# Patient Record
Sex: MALE | Race: WHITE | Employment: FULL TIME | URBAN - METROPOLITAN AREA
[De-identification: names, ages, dates, MRNs, and addresses within clinical notes are randomized per-mention and may not be internally consistent; named-entity substitution may affect disease eponyms.]

---

## 2022-03-22 ENCOUNTER — OFFICE VISIT (OUTPATIENT)
Dept: ORTHOPEDIC SURGERY | Age: 42
End: 2022-03-22
Payer: COMMERCIAL

## 2022-03-22 VITALS — HEIGHT: 72 IN | WEIGHT: 180 LBS | BODY MASS INDEX: 24.38 KG/M2

## 2022-03-22 DIAGNOSIS — S82.62XA CLOSED DISPLACED FRACTURE OF LATERAL MALLEOLUS OF LEFT FIBULA, INITIAL ENCOUNTER: Primary | ICD-10-CM

## 2022-03-22 DIAGNOSIS — M25.572 ACUTE LEFT ANKLE PAIN: ICD-10-CM

## 2022-03-22 PROCEDURE — 99203 OFFICE O/P NEW LOW 30 MIN: CPT | Performed by: ORTHOPAEDIC SURGERY

## 2022-03-22 NOTE — LETTER
3/23/2022    Patient: Sachin Daley   YOB: 1980   Date of Visit: 3/22/2022     Ze Chen MD  Via In Basket    Dear Ze Chen MD,      Thank you for referring Mr. Sachin Daley to [facility] for evaluation. My notes for this consultation are attached. If you have questions, please do not hesitate to call me. I look forward to following your patient along with you.       Sincerely,    Norm Umaña MD
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
## 2022-03-23 DIAGNOSIS — M25.572 ACUTE LEFT ANKLE PAIN: ICD-10-CM

## 2022-03-23 DIAGNOSIS — S82.62XA CLOSED DISPLACED FRACTURE OF LATERAL MALLEOLUS OF LEFT FIBULA, INITIAL ENCOUNTER: Primary | ICD-10-CM

## 2022-03-24 ENCOUNTER — TELEPHONE (OUTPATIENT)
Dept: ORTHOPEDIC SURGERY | Age: 42
End: 2022-03-24

## 2022-03-28 DIAGNOSIS — G89.18 ACUTE POST-OPERATIVE PAIN: ICD-10-CM

## 2022-03-28 DIAGNOSIS — S82.62XA CLOSED DISPLACED FRACTURE OF LATERAL MALLEOLUS OF LEFT FIBULA, INITIAL ENCOUNTER: Primary | ICD-10-CM

## 2022-03-28 RX ORDER — OXYCODONE HYDROCHLORIDE 5 MG/1
5 TABLET ORAL
Qty: 30 TABLET | Refills: 0 | Status: SHIPPED | OUTPATIENT
Start: 2022-03-28 | End: 2022-03-31

## 2022-03-28 NOTE — PROGRESS NOTES
Post op medications prescribed. Oxycodone 5 mg 1 tab po q4-6 prn pain, #20 no refills.   [facility] sent to [facility] in [place], [address]

## 2022-04-12 ENCOUNTER — OFFICE VISIT (OUTPATIENT)
Dept: ORTHOPEDIC SURGERY | Age: 42
End: 2022-04-12
Payer: COMMERCIAL

## 2022-04-12 DIAGNOSIS — Z09 SURGICAL FOLLOW-UP CARE: Primary | ICD-10-CM

## 2022-04-12 DIAGNOSIS — S82.62XD CLOSED DISPLACED FRACTURE OF LATERAL MALLEOLUS OF LEFT FIBULA WITH ROUTINE HEALING, SUBSEQUENT ENCOUNTER: ICD-10-CM

## 2022-04-12 PROCEDURE — L4387 NON-PNEUM WALK BOOT PRE OTS: HCPCS | Performed by: ORTHOPAEDIC SURGERY

## 2022-04-12 PROCEDURE — 99024 POSTOP FOLLOW-UP VISIT: CPT | Performed by: ORTHOPAEDIC SURGERY

## 2022-04-12 NOTE — LETTER
4/18/2022    Patient: London Mcclain   YOB: 1980   Date of Visit: 4/12/2022     Carin Apple [place] [id number]  Via Fax: [phone]    Dear Carin Apple MD,      Thank you for referring Mr. London Mcclain to [facility] for evaluation. My notes for this consultation are attached. If you have questions, please do not hesitate to call me. I look forward to following your patient along with you.       Sincerely,    Gloria Henry MD

## 2022-04-12 NOTE — PROGRESS NOTES
Khari Moncada (: 1980) is a 39 y.o. male, patient,here for evaluation of the following   Chief Complaint   Patient presents with    Surgical Follow-up     Closed displaced fracture of lateral malleolus of left fibula ORIF on 22        ASSESSMENT/PLAN:  Below is the assessment and plan developed based on review of pertinent history, physical exam, labs, studies, and medications. 1. Surgical follow-up care  -     REFERRAL TO DME  -     SC NON-PNEUM WALK BOOT PRE OTS  -     REFERRAL TO PHYSICAL THERAPY  2. Closed displaced fracture of lateral malleolus of left fibula with routine healing, subsequent encounter  -     XR ANKLE LT MIN 3 V; Future  -     REFERRAL TO DME  -     SC NON-PNEUM WALK BOOT PRE OTS  -     REFERRAL TO PHYSICAL THERAPY    Patient is doing very well status post lateral malleolus fracture open reduction internal fixation with syndesmosis fixation. He has no other complaints today. He is provided a tall boot and referred to physical therapy to start range of motion exercises. He is not ready to start weightbearing yet. However we did fit him with a tall boot today. Next time he returns we will get new x-rays of the left ankle 3 views nonweightbearing. Return in about 4 weeks (around 5/10/2022) for repeat xrays. No Known Allergies    Current Outpatient Medications   Medication Sig    EPINEPHrine (EPIPEN) 0.3 mg/0.3 mL injection 0.3 mL by IntraMUSCular route once as needed for 1 dose. No current facility-administered medications for this visit. No past medical history on file. Past Surgical History:   Procedure Laterality Date    HX ORTHOPAEDIC      plates on radius and ulna in left arm. No family history on file.     Social History     Socioeconomic History    Marital status:      Spouse name: Not on file    Number of children: Not on file    Years of education: Not on file    Highest education level: Not on file   Occupational History    Not on file   Tobacco Use    Smoking status: Never Smoker    Smokeless tobacco: Never Used   Substance and Sexual Activity    Alcohol use: Not Currently    Drug use: Not Currently    Sexual activity: Not on file   Other Topics Concern    Not on file   Social History Narrative    Not on file     Social Determinants of Health     Financial Resource Strain:     Difficulty of Paying Living Expenses: Not on file   Food Insecurity:     Worried About Running Out of Food in the Last Year: Not on file    Sofya of Food in the Last Year: Not on file   Transportation Needs:     Lack of Transportation (Medical): Not on file    Lack of Transportation (Non-Medical): Not on file   Physical Activity:     Days of Exercise per Week: Not on file    Minutes of Exercise per Session: Not on file   Stress:     Feeling of Stress : Not on file   Social Connections:     Frequency of Communication with Friends and Family: Not on file    Frequency of Social Gatherings with Friends and Family: Not on file    Attends Mandaeism Services: Not on file    Active Member of [address] [place] [address] [employer] or Organizations: Not on file    Attends Club or Organization Meetings: Not on file    Marital Status: Not on file   Intimate Partner Violence:     Fear of Current or Ex-Partner: Not on file    Emotionally Abused: Not on file    Physically Abused: Not on file    Sexually Abused: Not on file   Housing Stability:     Unable to Pay for Housing in the Last Year: Not on file    Number of [place] in the Last Year: Not on file    Unstable Housing in the Last Year: Not on file           Vitals: There were no vitals taken for this visit. There is no height or weight on file to calculate BMI. SUBJECTIVE/OBJECTIVE:  Nima Hodge (: 1980)   Patient back for follow-up regarding the left ankle displaced lateral malleolus fracture, date of surgery 2022.   Patient is 15 days postop, he has no complaints, doing very well.      Physical Exam  Pleasant well-nourished male , alert and oriented to person, time and place, no acute distress. Nonweightbearing gait, limited weightbearing stance. Left lower extremity/ankle: Calf soft nontender, incision site looks excellent, healing very well. Ligaments grossly stable. Left foot: Nontender, no swelling, ligament stable. Contralateral foot and ankle exam, nontender, no swelling ligaments grossly stable. Normal weightbearing stance. Neurovascular exam intact for light touch sensation, cap refill, dorsalis pedis pulse palpable, flexion/extension strength 5/5. Skin intact without open wounds, lesions or ulcers, no skin discolorations, normal warmth to skin. Imaging:    XR Results (most recent):  Results from Appointment encounter on 04/12/22    XR ANKLE LT MIN 3 V    Narrative  Left Ankle AP, lateral and oblique nonweightbearing x-rays show the well aligned ankle status post open reduction internal fixation and implants are intact, there is normal ankle mortise. Previous fracture not visible. An electronic signature was used to authenticate this note.   -- Oc Pollock MD

## 2022-04-29 ENCOUNTER — OFFICE VISIT (OUTPATIENT)
Dept: ORTHOPEDIC SURGERY | Age: 42
End: 2022-04-29
Payer: COMMERCIAL

## 2022-04-29 VITALS — WEIGHT: 180 LBS | BODY MASS INDEX: 24.38 KG/M2 | HEIGHT: 72 IN

## 2022-04-29 DIAGNOSIS — L08.9 SUPERFICIAL SKIN INFECTION: ICD-10-CM

## 2022-04-29 DIAGNOSIS — S82.62XD CLOSED DISPLACED FRACTURE OF LATERAL MALLEOLUS OF LEFT FIBULA WITH ROUTINE HEALING, SUBSEQUENT ENCOUNTER: ICD-10-CM

## 2022-04-29 DIAGNOSIS — Z09 SURGICAL FOLLOW-UP CARE: Primary | ICD-10-CM

## 2022-04-29 PROCEDURE — 99024 POSTOP FOLLOW-UP VISIT: CPT | Performed by: ORTHOPAEDIC SURGERY

## 2022-04-29 RX ORDER — CEPHALEXIN 500 MG/1
500 CAPSULE ORAL 4 TIMES DAILY
Qty: 12 CAPSULE | Refills: 0 | Status: SHIPPED | OUTPATIENT
Start: 2022-04-29 | End: 2022-06-14 | Stop reason: ALTCHOICE

## 2022-04-29 NOTE — LETTER
5/2/2022    Patient: Grupo Reza   YOB: 1980   Date of Visit: 4/29/2022     [place] [id number]  Via Fax: [phone]    Dear Kemal Chaney MD,      Thank you for referring Mr. Grupo Reza to [facility] for evaluation. My notes for this consultation are attached. If you have questions, please do not hesitate to call me. I look forward to following your patient along with you.       Sincerely,    Madeleine Quinonez MD

## 2022-04-29 NOTE — PROGRESS NOTES
Jeanne Payton (: 1980) is a 39 y.o. male, patient,here for evaluation of the following   Chief Complaint   Patient presents with    Surgical Follow-up     Closed displaced fracture of lateral malleolus of left fibula ORIF on 22        ASSESSMENT/PLAN:  Below is the assessment and plan developed based on review of pertinent history, physical exam, labs, studies, and medications. 1. Surgical follow-up care  2. Closed displaced fracture of lateral malleolus of left fibula with routine healing, subsequent encounter  -     XR ANKLE LT MIN 3 V; Future  -     REFERRAL TO PHYSICAL THERAPY  3. Superficial skin infection  -     cephALEXin (KEFLEX) 500 mg capsule; Take 1 Capsule by mouth four (4) times daily. Indications: an infection of the skin and the tissue below the skin, Normal, Disp-12 Capsule, R-0    Patient doing well with the exception of the cellulitis at the proximal part of the incision and I placed him on Keflex for few days. We did check the wound to make sure there was no sutures there and this wound is not deep. He will return sooner if any problems arise otherwise in 4 weeks. He is doing okay, going through physical therapy program which will continue and and a new referral is provided today. He is in a boot we will progress to weightbearing as tolerated in boot. Next time he returns we will get new x-rays of left ankle 3 views weightbearing if able to weight-bear. Return in about 6 weeks (around 6/10/2022) for repeat xrays. No Known Allergies    Current Outpatient Medications   Medication Sig    cephALEXin (KEFLEX) 500 mg capsule Take 1 Capsule by mouth four (4) times daily. Indications: an infection of the skin and the tissue below the skin     No current facility-administered medications for this visit. History reviewed. No pertinent past medical history.     Past Surgical History:   Procedure Laterality Date    HX ORTHOPAEDIC      plates on radius and ulna in left arm.       History reviewed. No pertinent family history. Social History     Socioeconomic History    Marital status:      Spouse name: Not on file    Number of children: Not on file    Years of education: Not on file    Highest education level: Not on file   Occupational History    Not on file   Tobacco Use    Smoking status: Never Smoker    Smokeless tobacco: Never Used   Substance and Sexual Activity    Alcohol use: Not Currently    Drug use: Not Currently    Sexual activity: Not on file   Other Topics Concern    Not on file   Social History Narrative    Not on file     Social Determinants of Health     Financial Resource Strain:     Difficulty of Paying Living Expenses: Not on file   Food Insecurity:     Worried About Running Out of Food in the Last Year: Not on file    Sofya of Food in the Last Year: Not on file   Transportation Needs:     Lack of Transportation (Medical): Not on file    Lack of Transportation (Non-Medical):  Not on file   Physical Activity:     Days of Exercise per Week: Not on file    Minutes of Exercise per Session: Not on file   Stress:     Feeling of Stress : Not on file   Social Connections:     Frequency of Communication with Friends and Family: Not on file    Frequency of Social Gatherings with Friends and Family: Not on file    Attends Pentecostal Services: Not on file    Active Member of [address] [place] [address] [employer] or Organizations: Not on file    Attends Club or Organization Meetings: Not on file    Marital Status: Not on file   Intimate Partner Violence:     Fear of Current or Ex-Partner: Not on file    Emotionally Abused: Not on file    Physically Abused: Not on file    Sexually Abused: Not on file   Housing Stability:     Unable to Pay for Housing in the Last Year: Not on file    Number of [place] in the Last Year: Not on file    Unstable Housing in the Last Year: Not on file           Vitals:  Ht 6' (1.829 m)   Wt 180 lb (81.6 kg)   BMI 24.41 kg/m² Body mass index is 24.41 kg/m². SUBJECTIVE/OBJECTIVE:  Leia Shoulders (: 1980)   Patient back for reevaluation of left lower extremity status post open reduction internal fixation of a displaced lateral malleolus fracture, date of surgery 2022. He is doing well he has no complaints today. Denies chest pain, shortness of breath, calf pain or swelling. Patient has been going to physical therapy program and was transition to a boot last time seen. He has not started fully weightbearing however. Physical Exam  Pleasant well-nourished male , alert and oriented to person, time and place, no acute distress. Nonweightbearing gait, limited weightbearing stance. Left lower extremity/ankle: Calves are soft nontender, ligaments grossly stable. The incision site is healing properly, there is minimal swelling. There is 1 area of the incision at the very top part of the incision there is little bit of erythema present/cellulitis without significant drainage but is tender on palpation. The fibula is nontender. Rest of ankle is intact and nontender. Left foot: Nontender, no swelling, ligament stable. Able to flex and extend toes satisfaction range of motion and strength. Neurovascular exam intact for light touch sensation, cap refill, dorsalis pedis pulse palpable, flexion/extension strength 5/5. Skin intact without open wounds, lesions or ulcers, no skin discolorations, normal warmth to skin. Imaging:    XR Results (most recent):  Results from Appointment encounter on 22    XR ANKLE LT MIN 3 V    Narrative  Left ankle AP, lateral and oblique nonweightbearing x-rays show implants intact at the lateral malleolus and the tight rope implant also intact. There is mostly normal ankle mortise, fracture is healing but not complete. Satisfactory bone density. Correction to x-rays, the fracture is healing very well and overall alignment looks good.     An electronic signature was used to authenticate this note.   -- Jerrod Lockwood MD

## 2022-06-14 ENCOUNTER — OFFICE VISIT (OUTPATIENT)
Dept: ORTHOPEDIC SURGERY | Age: 42
End: 2022-06-14
Payer: COMMERCIAL

## 2022-06-14 VITALS — WEIGHT: 180 LBS | BODY MASS INDEX: 24.38 KG/M2 | HEIGHT: 72 IN

## 2022-06-14 DIAGNOSIS — S82.62XD CLOSED DISPLACED FRACTURE OF LATERAL MALLEOLUS OF LEFT FIBULA WITH ROUTINE HEALING, SUBSEQUENT ENCOUNTER: ICD-10-CM

## 2022-06-14 DIAGNOSIS — Z09 SURGICAL FOLLOW-UP CARE: Primary | ICD-10-CM

## 2022-06-14 PROCEDURE — 99024 POSTOP FOLLOW-UP VISIT: CPT | Performed by: ORTHOPAEDIC SURGERY

## 2022-06-14 NOTE — LETTER
6/14/2022    Patient: Antolin Liriano   YOB: 1980   Date of Visit: 6/14/2022     [place] [id number]  Via Fax: [phone]    Dear Tiera Wheat MD,      Thank you for referring Mr. Antolin Liriano to [facility] for evaluation. My notes for this consultation are attached. If you have questions, please do not hesitate to call me. I look forward to following your patient along with you.       Sincerely,    Haseeb Roa MD

## 2022-06-14 NOTE — PROGRESS NOTES
Johnice Closs (: 1980) is a 43 y.o. male, patient,here for evaluation of the following   Chief Complaint   Patient presents with    Ankle Pain        ASSESSMENT/PLAN:  Below is the assessment and plan developed based on review of pertinent history, physical exam, labs, studies, and medications. 1. Surgical follow-up care  2. Closed displaced fracture of lateral malleolus of left fibula with routine healing, subsequent encounter  -     XR STANDING ANKLE LT MIN 3 V; Future    She is doing very well, he will progress activities as tolerated and wean out of boot return to full range of motion, strength, balance exercises. If he continues to have symptoms of pain related to implants and that includes the tightness of the ankle which could be related to the tight rope implant but can never guarantee hardware/implant removal will result in full recovery. He understands this and states he will progress with activities as tolerated and if any problems he will return. We briefly discussed hardware removal, I generally do recommend until near a year if needed. Return if symptoms worsen or fail to improve. No Known Allergies    Current Outpatient Medications   Medication Sig    cephALEXin (KEFLEX) 500 mg capsule Take 1 Capsule by mouth four (4) times daily. Indications: an infection of the skin and the tissue below the skin     No current facility-administered medications for this visit. No past medical history on file. Past Surgical History:   Procedure Laterality Date    HX ORTHOPAEDIC      plates on radius and ulna in left arm. No family history on file.     Social History     Socioeconomic History    Marital status:      Spouse name: Not on file    Number of children: Not on file    Years of education: Not on file    Highest education level: Not on file   Occupational History    Not on file   Tobacco Use    Smoking status: Never Smoker    Smokeless tobacco: Never Used Substance and Sexual Activity    Alcohol use: Not Currently    Drug use: Not Currently    Sexual activity: Not on file   Other Topics Concern    Not on file   Social History Narrative    Not on file     Social Determinants of Health     Financial Resource Strain:     Difficulty of Paying Living Expenses: Not on file   Food Insecurity:     Worried About Running Out of Food in the Last Year: Not on file    Sofya of Food in the Last Year: Not on file   Transportation Needs:     Lack of Transportation (Medical): Not on file    Lack of Transportation (Non-Medical): Not on file   Physical Activity:     Days of Exercise per Week: Not on file    Minutes of Exercise per Session: Not on file   Stress:     Feeling of Stress : Not on file   Social Connections:     Frequency of Communication with Friends and Family: Not on file    Frequency of Social Gatherings with Friends and Family: Not on file    Attends Zoroastrianism Services: Not on file    Active Member of [address] [place] [address] or Organizations: Not on file    Attends Club or Organization Meetings: Not on file    Marital Status: Not on file   Intimate Partner Violence:     Fear of Current or Ex-Partner: Not on file    Emotionally Abused: Not on file    Physically Abused: Not on file    Sexually Abused: Not on file   Housing Stability:     Unable to Pay for Housing in the Last Year: Not on file    Number of [place] in the Last Year: Not on file    Unstable Housing in the Last Year: Not on file           Vitals:  Ht 6' (1.829 m)   Wt 180 lb (81.6 kg)   BMI 24.41 kg/m²    Body mass index is 24.41 kg/m². ROS     Positive for: Musculoskeletal    Last edited by Luci Marley on 2022  8:16 AM. (History)              SUBJECTIVE/OBJECTIVE:  Nima Hodge (: 1980)   Patient back for reevaluation of left ankle we will he had a lateral malleolus displaced fracture, he is status post procedure near 3 months now.   He is doing well he has no other complaints except little bit of stiffness still present ankle joint, he also feels some of the screws underneath the skin. Overall his previous skin and superficial infection has fully resolved he has no other problems today. He feels he is on his way to full recovery. Physical Exam  Pleasant well-nourished male , alert and oriented to person, time and place, no acute distress. Minimal antalgic gait, satisfactory weightbearing stance. Left lower extremity/ankle: Soft nontender, range of motion is improved, there is some limitations to plantarflexion different from contralateral by about 5 degrees, anterior and lateral talar tilt stress of ankle shows stable ligaments, strength is near normal in all directions of motion at 5/5. Tendons are intact including Achilles tendon with negative Carrizales test, negative ankle squeeze test.  No significant tenderness along the fibula or medial malleolus, negative ankle squeeze test.    Left foot: No malalignment or deformity, nontender, no swelling, normal exam.    Neurovascular exam intact for light touch sensation, cap refill, dorsalis pedis pulse palpable, flexion/extension strength 5/5. Skin intact without open wounds, lesions or ulcers, no skin discolorations, normal warmth to skin. Imaging:    XR Results (most recent):  Results from Appointment encounter on 06/14/22    XR STANDING ANKLE LT MIN 3 V    Narrative  Left ankle AP, lateral and oblique standing x-rays show the fracture is completely healed, the ankle mortise is normal, implants appear intact including the tight rope implant. An electronic signature was used to authenticate this note.   -- Jeremy Grayson MD

## 2024-02-05 ENCOUNTER — OFFICE VISIT (OUTPATIENT)
Dept: URGENT CARE | Facility: CLINIC | Age: 44
End: 2024-02-05
Payer: COMMERCIAL

## 2024-02-05 VITALS
HEIGHT: 72 IN | WEIGHT: 180 LBS | RESPIRATION RATE: 14 BRPM | BODY MASS INDEX: 24.38 KG/M2 | SYSTOLIC BLOOD PRESSURE: 130 MMHG | HEART RATE: 65 BPM | TEMPERATURE: 98.5 F | DIASTOLIC BLOOD PRESSURE: 64 MMHG | OXYGEN SATURATION: 95 %

## 2024-02-05 DIAGNOSIS — L02.511 ABSCESS OF RIGHT HAND: ICD-10-CM

## 2024-02-05 DIAGNOSIS — L03.119 CELLULITIS OF PALM OF HAND: ICD-10-CM

## 2024-02-05 PROCEDURE — 99203 OFFICE O/P NEW LOW 30 MIN: CPT | Performed by: FAMILY MEDICINE

## 2024-02-05 PROCEDURE — 3075F SYST BP GE 130 - 139MM HG: CPT | Performed by: FAMILY MEDICINE

## 2024-02-05 PROCEDURE — 3078F DIAST BP <80 MM HG: CPT | Performed by: FAMILY MEDICINE

## 2024-02-05 RX ORDER — SULFAMETHOXAZOLE AND TRIMETHOPRIM 800; 160 MG/1; MG/1
1 TABLET ORAL 2 TIMES DAILY
Qty: 14 TABLET | Refills: 0 | Status: SHIPPED | OUTPATIENT
Start: 2024-02-05

## 2024-02-05 ASSESSMENT — ENCOUNTER SYMPTOMS
SENSORY CHANGE: 0
TINGLING: 0
VOMITING: 0
SORE THROAT: 0
CHILLS: 0
SHORTNESS OF BREATH: 0
COUGH: 0
FEVER: 0
NAUSEA: 0
FOCAL WEAKNESS: 0
MYALGIAS: 0

## 2024-02-06 NOTE — PROGRESS NOTES
Subjective:   Shemar Ruiz is a 43 y.o. male who presents for Laceration (Sx 2-3 weeks / not heeling )        Laceration   Incident onset: 3 weeks prior. Pain location: Right palm reports falling onto gravel with decomposed manage into wound, patient with reports self extracting gravel 4 times over the past 3 weeks. The laceration is 1 cm in size. The laceration mechanism was a blunt object. The pain is mild. The pain has been Intermittent since onset. Foreign body present: Reports no foreign body at this time, reports recent drainage of bloody fluid 2 days prior after manual pressure, reports persistent swelling with callus formation.     PMH:  has no past medical history on file.  MEDS:   Current Outpatient Medications:     sulfamethoxazole-trimethoprim (BACTRIM DS) 800-160 MG tablet, Take 1 Tablet by mouth 2 times a day., Disp: 14 Tablet, Rfl: 0  ALLERGIES:   Allergies   Allergen Reactions    Naproxen Shortness of Breath     Swelling      SURGHX: History reviewed. No pertinent surgical history.  SOCHX:    FH: History reviewed. No pertinent family history.  Review of Systems   Constitutional:  Negative for chills and fever.   HENT:  Negative for sore throat.    Respiratory:  Negative for cough and shortness of breath.    Gastrointestinal:  Negative for nausea and vomiting.   Musculoskeletal:  Negative for myalgias.   Skin:  Negative for rash.   Neurological:  Negative for tingling, sensory change and focal weakness.        Objective:   /64 (BP Location: Left arm, Patient Position: Sitting, BP Cuff Size: Adult)   Pulse 65   Temp 36.9 °C (98.5 °F) (Temporal)   Resp 14   Ht 1.829 m (6')   Wt 81.6 kg (180 lb)   SpO2 95%   BMI 24.41 kg/m²   Physical Exam  Vitals and nursing note reviewed.   Musculoskeletal:        Hands:            Assessment/Plan:   1. Abscess of right hand  - sulfamethoxazole-trimethoprim (BACTRIM DS) 800-160 MG tablet; Take 1 Tablet by mouth 2 times a day.  Dispense: 14 Tablet; Refill:  0    2. Cellulitis of palm of hand  - sulfamethoxazole-trimethoprim (BACTRIM DS) 800-160 MG tablet; Take 1 Tablet by mouth 2 times a day.  Dispense: 14 Tablet; Refill: 0        Medical Decision Making/Course:  In the course of preparing for this visit with review of the pertinent past medical history, recent and past clinic visits, current medications, and performing chart, immunization, medical history and medication reconciliation, and in the further course of obtaining the current history pertinent to the clinic visit today, performing an exam and evaluation, ordering and independently evaluating labs, tests  , and/or procedures, prescribing any recommended new medications as noted above, providing any pertinent counseling and education and recommending further coordination of care including recommendations for symptomatic and supportive measures and recommendation return and/or follow-up with primary care provider for any persistent or worsening symptoms, at least  16 minutes of total time were spent during this encounter.      Discussed close monitoring, return precautions, and supportive measures of maintaining adequate fluid hydration and caloric intake, relative rest and symptom management as needed for pain and/or fever.    Differential diagnosis, natural history, supportive care, and indications for immediate follow-up discussed.     Advised the patient to follow-up with the primary care physician for recheck, reevaluation, and consideration of further management.    Please note that this dictation was created using voice recognition software. I have worked with consultants from the vendor as well as technical experts from [employer] [facility] [employer] to optimize the interface. I have made every reasonable attempt to correct obvious errors, but I expect that there are errors of grammar and possibly content that I did not discover before finalizing the note.

## 2024-02-06 NOTE — PATIENT INSTRUCTIONS
Cellulitis, Adult    Cellulitis is a skin infection. The infected area is often warm, red, swollen, and sore. It occurs most often in the arms and lower legs. It is very important to get treated for this condition.  What are the causes?  This condition is caused by bacteria. The bacteria enter through a break in the skin, such as a cut, burn, insect bite, open sore, or crack.  What increases the risk?  This condition is more likely to occur in people who:  Have a weak body defense system (immune system).  Have open cuts, burns, bites, or scrapes on the skin.  Are older than 60 years of age.  Have a blood sugar problem (diabetes).  Have a long-lasting (chronic) liver disease (cirrhosis) or kidney disease.  Are very overweight (obese).  Have a skin problem, such as:  Itchy rash (eczema).  Slow movement of blood in the veins (venous stasis).  Fluid buildup below the skin (edema).  Have been treated with high-energy rays (radiation).  Use IV drugs.  What are the signs or symptoms?  Symptoms of this condition include:  Skin that is:  Red.  Streaking.  Spotting.  Swollen.  Sore or painful when you touch it.  Warm.  A fever.  Chills.  Blisters.  How is this diagnosed?  This condition is diagnosed based on:  Medical history.  Physical exam.  Blood tests.  Imaging tests.  How is this treated?  Treatment for this condition may include:  Medicines to treat infections or allergies.  Home care, such as:  Rest.  Placing cold or warm cloths (compresses) on the skin.  Hospital care, if the condition is very bad.  Follow these instructions at home:  Medicines  Take over-the-counter and prescription medicines only as told by your doctor.  If you were prescribed an antibiotic medicine, take it as told by your doctor. Do not stop taking it even if you start to feel better.  General instructions    Drink enough fluid to keep your pee (urine) pale yellow.  Do not touch or rub the infected area.  Raise (elevate) the infected area above  the level of your heart while you are sitting or lying down.  Place cold or warm cloths on the area as told by your doctor.  Keep all follow-up visits as told by your doctor. This is important.  Contact a doctor if:  You have a fever.  You do not start to get better after 1-2 days of treatment.  Your bone or joint under the infected area starts to hurt after the skin has healed.  Your infection comes back. This can happen in the same area or another area.  You have a swollen bump in the area.  You have new symptoms.  You feel ill and have muscle aches and pains.  Get help right away if:  Your symptoms get worse.  You feel very sleepy.  You throw up (vomit) or have watery poop (diarrhea) for a long time.  You see red streaks coming from the area.  Your red area gets larger.  Your red area turns dark in color.  These symptoms may represent a serious problem that is an emergency. Do not wait to see if the symptoms will go away. Get medical help right away. Call your local emergency services (911 in the [place] Do not drive yourself to the hospital.  Summary  Cellulitis is a skin infection. The area is often warm, red, swollen, and sore.  This condition is treated with medicines, rest, and cold and warm cloths.  Take all medicines only as told by your doctor.  Tell your doctor if symptoms do not start to get better after 1-2 days of treatment.  This information is not intended to replace advice given to you by your health care provider. Make sure you discuss any questions you have with your health care provider.  Document Revised: 09/29/2022 Document Reviewed: 09/29/2022  Elsevier Patient Education © 2023 [employer]

## 2024-02-19 ENCOUNTER — OFFICE VISIT (OUTPATIENT)
Dept: URGENT CARE | Facility: CLINIC | Age: 44
End: 2024-02-19
Payer: COMMERCIAL

## 2024-02-19 VITALS
OXYGEN SATURATION: 95 % | SYSTOLIC BLOOD PRESSURE: 122 MMHG | HEIGHT: 72 IN | WEIGHT: 180 LBS | RESPIRATION RATE: 18 BRPM | TEMPERATURE: 96.8 F | BODY MASS INDEX: 24.38 KG/M2 | HEART RATE: 94 BPM | DIASTOLIC BLOOD PRESSURE: 70 MMHG

## 2024-02-19 DIAGNOSIS — S61.401D OPEN WOUND OF RIGHT HAND WITHOUT FOREIGN BODY, UNSPECIFIED WOUND TYPE, SUBSEQUENT ENCOUNTER: ICD-10-CM

## 2024-02-19 PROCEDURE — 3078F DIAST BP <80 MM HG: CPT

## 2024-02-19 PROCEDURE — 97597 DBRDMT OPN WND 1ST 20 CM/<: CPT

## 2024-02-19 PROCEDURE — 3074F SYST BP LT 130 MM HG: CPT

## 2024-02-20 NOTE — PROGRESS NOTES
Chief Complaint   Patient presents with    Laceration    Follow-Up         Subjective:   HISTORY OF PRESENT ILLNESS: Shemar Ruiz is a 43 y.o. male who presents for a wound to his right hand. He reports about  3 weeks ago he fell cutting his hand on a big rock, it was healing slowly and came here for antibx.  He states that a callus and scab formed over the wound and one day about a week ago he caught the scab on something and it pulled off.  When it pulled off he reports a large piece of tissue came out of the wound and has continuously bled and leaked serous fluid.  This is not healing   Patient denies purulent drainage, chills or fevers    Medications, Allergies, current problem list, Social and Family history reviewed today in Epic.     Objective:     /70 (BP Location: Left arm, Patient Position: Sitting, BP Cuff Size: Adult)   Pulse 94   Temp 36 °C (96.8 °F) (Temporal)   Resp 18   Ht 1.829 m (6')   Wt 81.6 kg (180 lb)   SpO2 95%     Physical Exam  Vitals reviewed.   Constitutional:       Appearance: Normal appearance.   HENT:      Mouth/Throat:      Mouth: Mucous membranes are moist.   Cardiovascular:      Rate and Rhythm: Normal rate.   Pulmonary:      Effort: Pulmonary effort is normal.   Musculoskeletal:      Comments: Pt has a 0.5cm x 0.5 cm round piece of tissue that maybe subcutaneous tissue protruding from his palm, there is some serous drainage from the area.     Skin:     General: Skin is warm and dry.   Neurological:      Mental Status: He is alert and oriented to person, place, and time.   Psychiatric:         Mood and Affect: Mood normal.          Assessment/Plan:     Diagnosis and associated orders    I personally reviewed prior external notes and test results pertinent to today's visit.     1. Open wound of right hand without foreign body, unspecified wound type, subsequent encounter              IMPRESSION:  Pt has stable vital signs and no red flag symptoms or exam findings  identified.  This piece of tissue does not appear to be scabbing over, there are no signs of secondary infection.  Pt would like this tissue removed. An excision of the skin was completed, the wound underneath appear to be healing and now superficial.  Direct pressure was applied after using 1 sodium nitrite stick for hemostasis.   He will use otc polysporin on area and watch for signs of infection     Procedure: Incision and removal of tissue  - Risks, benefits, and alternatives reviewed.  - Verbal consent received from patient to proceed with incision   - Site prepared with Betadine.  - Clean technique with sterile instruments.  - Local anesthesia achieved with 5 mL of 2% lidocaine with epinephrine.  - the tissue was excised with #11 blade scalpel  - Irrigated copiously with NS.  - Minimal bleeding with good hemostasis achieved.  - Non-adhesive dressing applied.  - There were no procedural complications.  - Patient tolerated procedure well.     Differential diagnosis discussed. Pt was Educated on red flag symptoms. Pt has been Instructed to return to Urgent Care or nearest Emergency Department if symptoms fail to improve, for any change in condition, further concerns, or new concerning symptoms. Patient states understanding of the plan of care and discharge instructions.  They are discharged in stable condition.         Please note that this dictation was created using voice recognition software. I have made a reasonable attempt to correct obvious errors, but I expect that there are errors of grammar and possibly content that I did not discover before finalizing the note.    This note was electronically signed by LINDA Rockwell

## 2024-09-24 ENCOUNTER — APPOINTMENT (RX ONLY)
Dept: URBAN - METROPOLITAN AREA CLINIC 4 | Facility: CLINIC | Age: 44
Setting detail: DERMATOLOGY
End: 2024-09-24

## 2024-09-24 DIAGNOSIS — D22 MELANOCYTIC NEVI: ICD-10-CM

## 2024-09-24 DIAGNOSIS — Z71.89 OTHER SPECIFIED COUNSELING: ICD-10-CM

## 2024-09-24 DIAGNOSIS — B07.8 OTHER VIRAL WARTS: ICD-10-CM

## 2024-09-24 PROBLEM — D22.5 MELANOCYTIC NEVI OF TRUNK: Status: ACTIVE | Noted: 2024-09-24

## 2024-09-24 PROCEDURE — ? SUNSCREEN RECOMMENDATIONS

## 2024-09-24 PROCEDURE — 17110 DESTRUCTION B9 LES UP TO 14: CPT

## 2024-09-24 PROCEDURE — ? COUNSELING

## 2024-09-24 PROCEDURE — 99203 OFFICE O/P NEW LOW 30 MIN: CPT | Mod: 25

## 2024-09-24 PROCEDURE — ? LIQUID NITROGEN

## 2024-09-24 ASSESSMENT — LOCATION SIMPLE DESCRIPTION DERM
LOCATION SIMPLE: CHEST
LOCATION SIMPLE: LEFT HAND
LOCATION SIMPLE: RIGHT HAND
LOCATION SIMPLE: RIGHT UPPER BACK
LOCATION SIMPLE: ABDOMEN
LOCATION SIMPLE: RIGHT FOREARM
LOCATION SIMPLE: LEFT UPPER BACK

## 2024-09-24 ASSESSMENT — LOCATION DETAILED DESCRIPTION DERM
LOCATION DETAILED: STERNUM
LOCATION DETAILED: LEFT ULNAR DORSAL HAND
LOCATION DETAILED: RIGHT RADIAL DORSAL HAND
LOCATION DETAILED: LEFT INFERIOR MEDIAL UPPER BACK
LOCATION DETAILED: RIGHT DISTAL ULNAR DORSAL FOREARM
LOCATION DETAILED: RIGHT MEDIAL UPPER BACK
LOCATION DETAILED: EPIGASTRIC SKIN

## 2024-09-24 ASSESSMENT — LOCATION ZONE DERM
LOCATION ZONE: HAND
LOCATION ZONE: ARM
LOCATION ZONE: TRUNK

## 2024-09-24 NOTE — PROCEDURE: LIQUID NITROGEN
Medical Necessity Clause: This procedure was medically necessary because the lesions that were treated were:
Detail Level: Detailed
Post-Care Instructions: I reviewed with the patient in detail post-care instructions. Patient is to wear sunprotection, and avoid picking at any of the treated lesions. Pt may apply Vaseline to crusted or scabbing areas.
Include Z78.9 (Other Specified Conditions Influencing Health Status) As An Associated Diagnosis?: No
Show Spray Paint Technique Variable?: Yes
Spray Paint Text: The liquid nitrogen was applied to the skin utilizing a spray paint frosting technique.
Consent: The patient's consent was obtained including but not limited to risks of crusting, scabbing, blistering, scarring, darker or lighter pigmentary change, recurrence, incomplete removal and infection.
Medical Necessity Information: It is in your best interest to select a reason for this procedure from the list below. All of these items fulfill various CMS LCD requirements except the new and changing color options.

## 2025-01-09 ENCOUNTER — APPOINTMENT (OUTPATIENT)
Dept: MEDICAL GROUP | Age: 45
End: 2025-01-09
Payer: COMMERCIAL

## 2025-01-09 VITALS
BODY MASS INDEX: 26.4 KG/M2 | DIASTOLIC BLOOD PRESSURE: 80 MMHG | HEIGHT: 71 IN | TEMPERATURE: 97 F | OXYGEN SATURATION: 96 % | WEIGHT: 188.6 LBS | HEART RATE: 69 BPM | SYSTOLIC BLOOD PRESSURE: 112 MMHG

## 2025-01-09 DIAGNOSIS — Z12.11 COLON CANCER SCREENING: ICD-10-CM

## 2025-01-09 DIAGNOSIS — Z00.00 WELLNESS EXAMINATION: ICD-10-CM

## 2025-01-09 DIAGNOSIS — E66.3 OVERWEIGHT (BMI 25.0-29.9): ICD-10-CM

## 2025-01-09 DIAGNOSIS — Z11.4 SCREENING FOR HIV (HUMAN IMMUNODEFICIENCY VIRUS): ICD-10-CM

## 2025-01-09 DIAGNOSIS — Z80.0 FAMILY HISTORY OF COLON CANCER IN MOTHER: ICD-10-CM

## 2025-01-09 DIAGNOSIS — Z11.59 NEED FOR HEPATITIS C SCREENING TEST: ICD-10-CM

## 2025-01-09 DIAGNOSIS — Z23 NEED FOR VACCINATION: ICD-10-CM

## 2025-01-09 PROCEDURE — 3074F SYST BP LT 130 MM HG: CPT | Performed by: STUDENT IN AN ORGANIZED HEALTH CARE EDUCATION/TRAINING PROGRAM

## 2025-01-09 PROCEDURE — 99214 OFFICE O/P EST MOD 30 MIN: CPT | Mod: 25 | Performed by: STUDENT IN AN ORGANIZED HEALTH CARE EDUCATION/TRAINING PROGRAM

## 2025-01-09 PROCEDURE — 3079F DIAST BP 80-89 MM HG: CPT | Performed by: STUDENT IN AN ORGANIZED HEALTH CARE EDUCATION/TRAINING PROGRAM

## 2025-01-09 PROCEDURE — 90471 IMMUNIZATION ADMIN: CPT

## 2025-01-09 PROCEDURE — 90715 TDAP VACCINE 7 YRS/> IM: CPT

## 2025-01-09 ASSESSMENT — ENCOUNTER SYMPTOMS
DIARRHEA: 0
NECK PAIN: 0
WEIGHT LOSS: 0
BACK PAIN: 0
ABDOMINAL PAIN: 0
SHORTNESS OF BREATH: 0
FEVER: 0
SORE THROAT: 0
NAUSEA: 0
COUGH: 0
PALPITATIONS: 0
EYE DISCHARGE: 0
CONSTIPATION: 0
HEMOPTYSIS: 0
CHILLS: 0
MYALGIAS: 0
BLOOD IN STOOL: 0
VOMITING: 0
BLURRED VISION: 0
ORTHOPNEA: 0
DIZZINESS: 0
HEADACHES: 0
DEPRESSION: 0
EYE PAIN: 0
TREMORS: 0
TINGLING: 0

## 2025-01-09 ASSESSMENT — PATIENT HEALTH QUESTIONNAIRE - PHQ9: CLINICAL INTERPRETATION OF PHQ2 SCORE: 0

## 2025-01-09 NOTE — PROGRESS NOTES
Verbal consent was acquired by the patient to use Showpad ambient listening note generation during this visit     Subjective:     HPI:   History of Present Illness  The patient is a 44-year-old male who presents to [facility] care.    He has expressed interest in undergoing a colonoscopy, citing his mother's history of colorectal cancer diagnosed at the age of 62 and diverticulitis. He also mentions that his younger brother had a non-cancerous condition that warranted further investigation. He is considering genetic testing for Garza syndrome, given his family history of colorectal cancer. He reports no urinary issues and has no family history of prostate cancer. He maintains an active lifestyle, engaging in mountain biking approximately three times a week, snowboarding, and regular gym workouts. He reports no systemic symptoms such as fever, chills, or headaches. He also reports no chest pain, respiratory distress, abdominal pain, constipation, joint pain, or skin issues, except for some redness which he attributes to sun exposure. His current supplement regimen includes vitamin D3 plus K and a multivitamin.      FAMILY HISTORY  His mother had colorectal cancer diagnosed at age 60-62 and diverticulitis. No family history of prostate cancer.    MEDICATIONS  Current: Vitamin D3 plus K, multivitamin.  Discontinued: Bactrim.    IMMUNIZATIONS  He is due for a tetanus shot, as it has been over 10 years since his last one.    Health Maintenance: Completed    Review of Systems   Constitutional:  Negative for chills, fever, malaise/fatigue and weight loss.   HENT:  Negative for congestion, ear pain, hearing loss and sore throat.    Eyes:  Negative for blurred vision, pain and discharge.   Respiratory:  Negative for cough, hemoptysis and shortness of breath.    Cardiovascular:  Negative for chest pain, palpitations and orthopnea.   Gastrointestinal:  Negative for abdominal pain, blood in stool, constipation, diarrhea,  "melena, nausea and vomiting.   Genitourinary:  Negative for dysuria, frequency, hematuria and urgency.   Musculoskeletal:  Negative for back pain, joint pain, myalgias and neck pain.   Skin:  Negative for rash.   Neurological:  Negative for dizziness, tingling, tremors and headaches.   Psychiatric/Behavioral:  Negative for depression and suicidal ideas.          Objective:     Exam:  /80 (BP Location: Left arm, Patient Position: Sitting, BP Cuff Size: Adult)   Pulse 69   Temp 36.1 °C (97 °F) (Temporal)   Ht 1.803 m (5' 11\")   Wt 85.5 kg (188 lb 9.6 oz)   SpO2 96%   BMI 26.30 kg/m²  Body mass index is 26.3 kg/m².    Physical Exam  Constitutional:       General: He is not in acute distress.     Appearance: Normal appearance. He is normal weight. He is not ill-appearing, toxic-appearing or diaphoretic.   HENT:      Head: Normocephalic and atraumatic.      Right Ear: Tympanic membrane, ear canal and external ear normal.      Left Ear: Tympanic membrane, ear canal and external ear normal.      Nose: Nose normal.      Mouth/Throat:      Mouth: Mucous membranes are moist.      Pharynx: Oropharynx is clear.   Eyes:      General:         Right eye: No discharge.         Left eye: No discharge.      Extraocular Movements: Extraocular movements intact.      Conjunctiva/sclera: Conjunctivae normal.      Pupils: Pupils are equal, round, and reactive to light.   Cardiovascular:      Rate and Rhythm: Normal rate and regular rhythm.      Pulses: Normal pulses.      Heart sounds: Normal heart sounds. No murmur heard.  Pulmonary:      Effort: No respiratory distress.      Breath sounds: No stridor. No wheezing.   Abdominal:      General: Abdomen is flat. Bowel sounds are normal. There is no distension.      Palpations: Abdomen is soft.      Tenderness: There is no abdominal tenderness. There is no right CVA tenderness or guarding.   Musculoskeletal:         General: No swelling, tenderness, deformity or signs of injury. " Normal range of motion.      Cervical back: Normal range of motion and neck supple. No rigidity or tenderness.      Right lower leg: No edema.      Left lower leg: No edema.   Lymphadenopathy:      Cervical: No cervical adenopathy.   Skin:     General: Skin is warm and dry.      Capillary Refill: Capillary refill takes less than 2 seconds.      Coloration: Skin is not jaundiced or pale.      Findings: No bruising, erythema, lesion or rash.   Neurological:      General: No focal deficit present.      Mental Status: He is alert and oriented to person, place, and time. Mental status is at baseline.      Cranial Nerves: No cranial nerve deficit.      Sensory: No sensory deficit.      Motor: No weakness.      Coordination: Coordination normal.      Gait: Gait normal.      Deep Tendon Reflexes: Reflexes normal.   Psychiatric:         Mood and Affect: Mood normal.         Behavior: Behavior normal.         Thought Content: Thought content normal.         Judgment: Judgment normal.             Results      Assessment & Plan:     1. Screening for HIV (human immunodeficiency virus)  HIV AG/AB COMBO ASSAY SCREENING      2. Need for hepatitis C screening test  HEP C VIRUS ANTIBODY      3. Wellness examination  CBC WITHOUT DIFFERENTIAL    Comp Metabolic Panel    HEMOGLOBIN A1C    Lipid Profile    TSH WITH REFLEX TO FT4    VITAMIN D,25 HYDROXY (DEFICIENCY)      4. Need for vaccination  Tdap Vaccine =>8YO IM      5. Family history of colon cancer in mother  Referral to GI for Colonoscopy      6. Colon cancer screening  Referral to GI for Colonoscopy      7. Overweight (BMI 25.0-29.9)            Assessment & Plan  1. Family history of colon cancer  Mom diagnosed with colon cancer at 62  Referral for colonoscopy placed  HNP referral declined by pt    2. Overweight  Pt maintains very active and healthy lifestyle     3. Health maintenance  Routine labs ordered           Referral for genetic research was offered. Patient  declined        Return in about 4 weeks (around 2/6/2025) for annual visit.    Please note that this dictation was created using voice recognition software. I have made every reasonable attempt to correct obvious errors, but I expect that there are errors of grammar and possibly content that I did not discover before finalizing the note.

## 2025-02-21 ENCOUNTER — HOSPITAL ENCOUNTER (OUTPATIENT)
Facility: MEDICAL CENTER | Age: 45
End: 2025-02-21
Attending: STUDENT IN AN ORGANIZED HEALTH CARE EDUCATION/TRAINING PROGRAM
Payer: COMMERCIAL

## 2025-02-21 DIAGNOSIS — Z00.00 WELLNESS EXAMINATION: ICD-10-CM

## 2025-02-21 DIAGNOSIS — Z11.59 NEED FOR HEPATITIS C SCREENING TEST: ICD-10-CM

## 2025-02-21 DIAGNOSIS — Z11.4 SCREENING FOR HIV (HUMAN IMMUNODEFICIENCY VIRUS): ICD-10-CM

## 2025-02-21 LAB
25(OH)D3 SERPL-MCNC: 35 NG/ML (ref 30–100)
ALBUMIN SERPL BCP-MCNC: 4.3 G/DL (ref 3.2–4.9)
ALBUMIN/GLOB SERPL: 1.4 G/DL
ALP SERPL-CCNC: 91 U/L (ref 30–99)
ALT SERPL-CCNC: 25 U/L (ref 2–50)
ANION GAP SERPL CALC-SCNC: 11 MMOL/L (ref 7–16)
AST SERPL-CCNC: 29 U/L (ref 12–45)
BILIRUB SERPL-MCNC: 0.4 MG/DL (ref 0.1–1.5)
BUN SERPL-MCNC: 18 MG/DL (ref 8–22)
CALCIUM ALBUM COR SERPL-MCNC: 9.6 MG/DL (ref 8.5–10.5)
CALCIUM SERPL-MCNC: 9.8 MG/DL (ref 8.5–10.5)
CHLORIDE SERPL-SCNC: 104 MMOL/L (ref 96–112)
CHOLEST SERPL-MCNC: 280 MG/DL (ref 100–199)
CO2 SERPL-SCNC: 25 MMOL/L (ref 20–33)
CREAT SERPL-MCNC: 1.19 MG/DL (ref 0.5–1.4)
ERYTHROCYTE [DISTWIDTH] IN BLOOD BY AUTOMATED COUNT: 46.1 FL (ref 35.9–50)
EST. AVERAGE GLUCOSE BLD GHB EST-MCNC: 117 MG/DL
FASTING STATUS PATIENT QL REPORTED: NORMAL
GFR SERPLBLD CREATININE-BSD FMLA CKD-EPI: 77 ML/MIN/1.73 M 2
GLOBULIN SER CALC-MCNC: 3.1 G/DL (ref 1.9–3.5)
GLUCOSE SERPL-MCNC: 98 MG/DL (ref 65–99)
HBA1C MFR BLD: 5.7 % (ref 4–5.6)
HCT VFR BLD AUTO: 49.4 % (ref 42–52)
HCV AB SER QL: NORMAL
HDLC SERPL-MCNC: 55 MG/DL
HGB BLD-MCNC: 15.8 G/DL (ref 14–18)
HIV 1+2 AB+HIV1 P24 AG SERPL QL IA: NORMAL
LDLC SERPL CALC-MCNC: 205 MG/DL
MCH RBC QN AUTO: 31.9 PG (ref 27–33)
MCHC RBC AUTO-ENTMCNC: 32 G/DL (ref 32.3–36.5)
MCV RBC AUTO: 99.6 FL (ref 81.4–97.8)
PLATELET # BLD AUTO: 223 K/UL (ref 164–446)
PMV BLD AUTO: 11.1 FL (ref 9–12.9)
POTASSIUM SERPL-SCNC: 4.1 MMOL/L (ref 3.6–5.5)
PROT SERPL-MCNC: 7.4 G/DL (ref 6–8.2)
RBC # BLD AUTO: 4.96 M/UL (ref 4.7–6.1)
SODIUM SERPL-SCNC: 140 MMOL/L (ref 135–145)
TRIGL SERPL-MCNC: 101 MG/DL (ref 0–149)
TSH SERPL DL<=0.005 MIU/L-ACNC: 2.17 UIU/ML (ref 0.38–5.33)
WBC # BLD AUTO: 4.3 K/UL (ref 4.8–10.8)

## 2025-02-21 PROCEDURE — 80053 COMPREHEN METABOLIC PANEL: CPT

## 2025-02-21 PROCEDURE — 85027 COMPLETE CBC AUTOMATED: CPT

## 2025-02-21 PROCEDURE — 87389 HIV-1 AG W/HIV-1&-2 AB AG IA: CPT

## 2025-02-21 PROCEDURE — 80061 LIPID PANEL: CPT

## 2025-02-21 PROCEDURE — 84443 ASSAY THYROID STIM HORMONE: CPT

## 2025-02-21 PROCEDURE — 83036 HEMOGLOBIN GLYCOSYLATED A1C: CPT

## 2025-02-21 PROCEDURE — 82306 VITAMIN D 25 HYDROXY: CPT

## 2025-02-21 PROCEDURE — 36415 COLL VENOUS BLD VENIPUNCTURE: CPT

## 2025-02-21 PROCEDURE — 86803 HEPATITIS C AB TEST: CPT

## 2025-02-24 ENCOUNTER — OFFICE VISIT (OUTPATIENT)
Dept: MEDICAL GROUP | Age: 45
End: 2025-02-24
Payer: COMMERCIAL

## 2025-02-24 VITALS
SYSTOLIC BLOOD PRESSURE: 114 MMHG | WEIGHT: 191 LBS | DIASTOLIC BLOOD PRESSURE: 78 MMHG | HEIGHT: 71 IN | HEART RATE: 69 BPM | OXYGEN SATURATION: 94 % | BODY MASS INDEX: 26.74 KG/M2 | TEMPERATURE: 97.1 F

## 2025-02-24 DIAGNOSIS — E78.00 PURE HYPERCHOLESTEROLEMIA: ICD-10-CM

## 2025-02-24 DIAGNOSIS — Z00.00 WELLNESS EXAMINATION: ICD-10-CM

## 2025-02-24 DIAGNOSIS — R73.03 PREDIABETES: ICD-10-CM

## 2025-02-24 PROCEDURE — 3074F SYST BP LT 130 MM HG: CPT | Performed by: STUDENT IN AN ORGANIZED HEALTH CARE EDUCATION/TRAINING PROGRAM

## 2025-02-24 PROCEDURE — 99396 PREV VISIT EST AGE 40-64: CPT | Performed by: STUDENT IN AN ORGANIZED HEALTH CARE EDUCATION/TRAINING PROGRAM

## 2025-02-24 PROCEDURE — 99214 OFFICE O/P EST MOD 30 MIN: CPT | Mod: 25 | Performed by: STUDENT IN AN ORGANIZED HEALTH CARE EDUCATION/TRAINING PROGRAM

## 2025-02-24 PROCEDURE — 3078F DIAST BP <80 MM HG: CPT | Performed by: STUDENT IN AN ORGANIZED HEALTH CARE EDUCATION/TRAINING PROGRAM

## 2025-02-24 ASSESSMENT — FIBROSIS 4 INDEX: FIB4 SCORE: 1.14

## 2025-02-24 NOTE — PROGRESS NOTES
Subjective:     CC:   Chief Complaint   Patient presents with    Annual Exam    Lab Results     Wants to go over cholesterol        HPI:   Shemar Ruiz is a 44 y.o. male who presents for the following    History of Present Illness  The patient is a 44-year-old male who presents for an annual exam.    He maintains a high level of physical activity and consumes a diet rich in carbohydrates. He reports no history of sexually transmitted diseases. He does not experience any chest pain or respiratory distress during physical exertion. Additionally, he reports no gastrointestinal symptoms such as abdominal pain, diarrhea, or constipation.    He has been incorporating a multivitamin into his regimen, which includes vitamin D and K, and has recently initiated this practice.    He has a family history of hypercholesterolemia on his paternal side, with several relatives affected. His father was prescribed statins around the age of 55 to 58.    He has been referred for a colonoscopy, which is scheduled to coincide with his 45th birthday in May 2025.    Supplemental Information  He reports experiencing pain in his right heel, but it is not chronic.    SOCIAL HISTORY  He drinks soda maybe once a month.    FAMILY HISTORY  His dad's side of the family has a history of high cholesterol. His dad was on statins around the age of 55 to 58.    MEDICATIONS  Current: multivitamin, vitamin D+K        Last Colorectal Cancer Screening: colonoscopy will be scheduled in May  Last Tdap: 1/9/2025  Received HPV series: Aged out  Hx STDs: No    Exercise: strenuous regular exercise, aerobic > 3 hours a week  Diet: carbohydrate rich due to lots of exercise      He  has no past medical history on file.  He  has a past surgical history that includes orif, ankle (Left, 2023) and orif, forearm (Left, 1998).    Family History   Problem Relation Age of Onset    Colon Cancer Mother 62     Social History     Tobacco Use    Smoking status: Never     "Smokeless tobacco: Never   Vaping Use    Vaping status: Never Used   Substance Use Topics    Alcohol use: Yes     Alcohol/week: 0.6 oz     Types: 1 Cans of beer per week     Comment: 1 a day    Drug use: Never     He  reports being sexually active and has had partner(s) who are female.    Patient Active Problem List    Diagnosis Date Noted    Pure hypercholesterolemia 02/24/2025    Prediabetes 02/24/2025    Family history of colon cancer in mother 01/09/2025    Overweight (BMI 25.0-29.9) 01/09/2025     No current outpatient medications on file.     No current facility-administered medications for this visit.     Allergies   Allergen Reactions    Naproxen Shortness of Breath     Swelling        Review of Systems   Constitutional: Negative for fever, chills and malaise/fatigue.   HENT: Negative for congestion.    Eyes: Negative for pain.   Respiratory: Negative for cough and shortness of breath.    Cardiovascular: Negative for chest pain and leg swelling.   Gastrointestinal: Negative for nausea, vomiting, abdominal pain and diarrhea.   Genitourinary: Negative for dysuria and hematuria.   Skin: Negative for rash.   Neurological: Negative for dizziness, focal weakness and headaches.   Endo/Heme/Allergies: Does not bruise/bleed easily.   Psychiatric/Behavioral: Negative for depression.  The patient is not nervous/anxious.      Objective:   /78 (BP Location: Right arm, Patient Position: Sitting, BP Cuff Size: Adult)   Pulse 69   Temp 36.2 °C (97.1 °F) (Temporal)   Ht 1.803 m (5' 11\")   Wt 86.6 kg (191 lb)   SpO2 94%   BMI 26.64 kg/m²      Wt Readings from Last 4 Encounters:   02/24/25 86.6 kg (191 lb)   01/09/25 85.5 kg (188 lb 9.6 oz)   02/19/24 81.6 kg (180 lb)   02/05/24 81.6 kg (180 lb)           Physical Exam:  Constitutional: Alert, no distress, well-groomed.  Skin: No rashes in visible areas.  Eye: Round. Conjunctiva clear, lids normal. No icterus.   ENMT: Lips pink without lesions, good dentition, " moist mucous membranes. Phonation normal.  Neck: No masses, no thyromegaly. Moves freely without pain.  Respiratory: Unlabored respiratory effort, no cough or audible wheeze  Psych: Alert and oriented x3, normal affect and mood.       Assessment and Plan:     1. Wellness examination    2. Pure hypercholesterolemia  - Referral to Genetic Research Studies  - Lipid Profile; Future  - APOLIPOPROTEIN B; Future  - CT-CARDIAC SCORING; Future    3. Prediabetes  - HEMOGLOBIN A1C; Future    Assessment & Plan  1. Annual examination.  His blood pressure readings are within the normal range, and his weight is also satisfactory. Renal function is optimal, and thyroid function is within normal limits. Electrolyte levels are balanced, and liver enzyme levels are within the normal range. Hemoglobin levels are normal, although there is a slight elevation in MCV levels. He is currently in the prediabetic stage, with an A1c level of 5.7 to 6.4. HDL cholesterol levels are within the desired range, and triglyceride levels are not significantly elevated. The triglyceride to HDL ratio is less than 2, which is considered healthy. However, total cholesterol levels are slightly elevated. He has been advised to abstain from consuming sodas and other sugary beverages. He has been encouraged to incorporate healthy fats into his diet, such as olive oil, avocado oil, and fish. He has been advised to schedule a colonoscopy around his 45th birthday in May 2025. Prostate cancer screening will be discussed when he reaches the age of 50, unless any issues arise before then. A repeat cholesterol test will be conducted in 3 to 6 months to monitor any changes. A coronary calcium score test will be ordered to assess the extent of coronary plaque. He has been referred to [facility] for familial hypercholesterolemia testing. A repeat A1c test and lipid panel will be ordered. An ApoB test will also be ordered. If the coronary calcium score  indicates a low risk of heart attack, statin therapy may be deferred. If the results are ambiguous, a referral to cardiology will be made for further discussion on cardiac risk.    2. Hypercholesterolemia.  Total cholesterol levels are slightly elevated. He has been advised to make dietary adjustments. A repeat lipid panel will be ordered in 3 to 6 months. A coronary calcium score test will be ordered to assess the extent of coronary plaque. He has been referred to [facility] for familial hypercholesterolemia testing. An ApoB test will also be ordered. If the coronary calcium score indicates a low risk of heart attack, statin therapy may be deferred. If the results are ambiguous, a referral to cardiology will be made for further discussion on cardiac risk.    3. Prediabetes.  A1c level is barely in the prediabetes range 5.7% (5.7-6.4). He has been advised to reduce sugar and carbohydrate intake. A repeat A1c test will be ordered.    Follow-up  The patient is scheduled for a follow-up visit in 6 months.        We reviewed anticipatory guidelines  The patient is up-to-date on all vaccines  The patient is  due for annual labs  We discussed diet and exercise  Specifically we discussed needing 150 minutes of exercise weekly  Also to incorporate and aerobic exercises  We discussed sunscreen  We discussed seatbelt safety       Health maintenance:     Labs per orders  Immunizations per orders  Patient counseled about skin care, diet, supplements, and exercise.  Discussed diet and exercise, colorectal cancer screening, adequate intake of calcium and vitamin D, and prostate cancer screening     Follow-up: Return in about 6 months (around 8/24/2025) for lab results.

## 2025-07-25 ENCOUNTER — HOSPITAL ENCOUNTER (OUTPATIENT)
Dept: RADIOLOGY | Facility: MEDICAL CENTER | Age: 45
End: 2025-07-25
Attending: STUDENT IN AN ORGANIZED HEALTH CARE EDUCATION/TRAINING PROGRAM
Payer: COMMERCIAL

## 2025-07-25 DIAGNOSIS — E78.00 PURE HYPERCHOLESTEROLEMIA: ICD-10-CM

## 2025-07-25 PROCEDURE — 4410556 CT-CARDIAC SCORING (SELF PAY ONLY)

## 2025-08-28 ENCOUNTER — OFFICE VISIT (OUTPATIENT)
Dept: MEDICAL GROUP | Age: 45
End: 2025-08-28
Payer: COMMERCIAL

## 2025-08-28 VITALS
WEIGHT: 189 LBS | HEART RATE: 82 BPM | OXYGEN SATURATION: 94 % | SYSTOLIC BLOOD PRESSURE: 100 MMHG | TEMPERATURE: 97.4 F | DIASTOLIC BLOOD PRESSURE: 62 MMHG | HEIGHT: 71 IN | BODY MASS INDEX: 26.46 KG/M2

## 2025-08-28 DIAGNOSIS — E78.00 PURE HYPERCHOLESTEROLEMIA: ICD-10-CM

## 2025-08-28 DIAGNOSIS — R73.03 PREDIABETES: ICD-10-CM

## 2025-08-28 DIAGNOSIS — Z13.0 SCREENING FOR DEFICIENCY ANEMIA: ICD-10-CM

## 2025-08-28 DIAGNOSIS — R79.89 LOW VITAMIN D LEVEL: ICD-10-CM

## 2025-08-28 DIAGNOSIS — Z78.9 VEGETARIAN: ICD-10-CM

## 2025-08-28 DIAGNOSIS — E66.3 OVERWEIGHT (BMI 25.0-29.9): Primary | ICD-10-CM

## 2025-08-28 PROCEDURE — 3078F DIAST BP <80 MM HG: CPT | Performed by: STUDENT IN AN ORGANIZED HEALTH CARE EDUCATION/TRAINING PROGRAM

## 2025-08-28 PROCEDURE — 99214 OFFICE O/P EST MOD 30 MIN: CPT | Performed by: STUDENT IN AN ORGANIZED HEALTH CARE EDUCATION/TRAINING PROGRAM

## 2025-08-28 PROCEDURE — 3074F SYST BP LT 130 MM HG: CPT | Performed by: STUDENT IN AN ORGANIZED HEALTH CARE EDUCATION/TRAINING PROGRAM

## 2025-08-28 ASSESSMENT — FIBROSIS 4 INDEX: FIB4 SCORE: 1.17
